# Patient Record
Sex: MALE | Race: BLACK OR AFRICAN AMERICAN | NOT HISPANIC OR LATINO | URBAN - METROPOLITAN AREA
[De-identification: names, ages, dates, MRNs, and addresses within clinical notes are randomized per-mention and may not be internally consistent; named-entity substitution may affect disease eponyms.]

---

## 2019-03-10 ENCOUNTER — EMERGENCY (EMERGENCY)
Facility: HOSPITAL | Age: 59
LOS: 1 days | Discharge: HOME | End: 2019-03-10
Attending: EMERGENCY MEDICINE
Payer: SELF-PAY

## 2019-03-10 VITALS
WEIGHT: 139.99 LBS | OXYGEN SATURATION: 96 % | HEIGHT: 64 IN | DIASTOLIC BLOOD PRESSURE: 82 MMHG | SYSTOLIC BLOOD PRESSURE: 162 MMHG | RESPIRATION RATE: 16 BRPM | TEMPERATURE: 98 F | HEART RATE: 83 BPM

## 2019-03-10 VITALS
HEART RATE: 74 BPM | OXYGEN SATURATION: 99 % | TEMPERATURE: 98 F | RESPIRATION RATE: 15 BRPM | DIASTOLIC BLOOD PRESSURE: 70 MMHG | SYSTOLIC BLOOD PRESSURE: 138 MMHG

## 2019-03-10 PROCEDURE — 71046 X-RAY EXAM CHEST 2 VIEWS: CPT

## 2019-03-10 PROCEDURE — 94640 AIRWAY INHALATION TREATMENT: CPT

## 2019-03-10 PROCEDURE — 99284 EMERGENCY DEPT VISIT MOD MDM: CPT | Mod: 25

## 2019-03-10 PROCEDURE — 99283 EMERGENCY DEPT VISIT LOW MDM: CPT

## 2019-03-10 PROCEDURE — 71046 X-RAY EXAM CHEST 2 VIEWS: CPT | Mod: 26

## 2019-03-10 RX ORDER — AZITHROMYCIN 500 MG/1
1 TABLET, FILM COATED ORAL
Qty: 6 | Refills: 0 | OUTPATIENT
Start: 2019-03-10 | End: 2019-03-14

## 2019-03-10 RX ORDER — LORATADINE 10 MG/1
10 TABLET ORAL DAILY
Qty: 0 | Refills: 0 | Status: DISCONTINUED | OUTPATIENT
Start: 2019-03-10 | End: 2019-03-14

## 2019-03-10 RX ORDER — AZITHROMYCIN 500 MG/1
500 TABLET, FILM COATED ORAL ONCE
Qty: 0 | Refills: 0 | Status: COMPLETED | OUTPATIENT
Start: 2019-03-10 | End: 2019-03-10

## 2019-03-10 RX ORDER — ALBUTEROL 90 UG/1
2.5 AEROSOL, METERED ORAL ONCE
Qty: 0 | Refills: 0 | Status: COMPLETED | OUTPATIENT
Start: 2019-03-10 | End: 2019-03-10

## 2019-03-10 RX ADMIN — AZITHROMYCIN 500 MILLIGRAM(S): 500 TABLET, FILM COATED ORAL at 22:38

## 2019-03-10 RX ADMIN — Medication 20 MILLIGRAM(S): at 21:42

## 2019-03-10 RX ADMIN — ALBUTEROL 2.5 MILLIGRAM(S): 90 AEROSOL, METERED ORAL at 21:42

## 2019-03-10 RX ADMIN — LORATADINE 10 MILLIGRAM(S): 10 TABLET ORAL at 21:41

## 2019-03-10 NOTE — ED PROVIDER NOTE - OBJECTIVE STATEMENT
57 y/o F patient w/ no significant PMHx and no significant PSHx presents to the ED with a cough. Patient reports he has rajni experiencing a productive cough for more than x2 weeks. Patient states he has become short of breathe along with feeling dizzy and nasal congestion. Patient states his cough occurs mostly at night. 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 Patient denies receiving the flu shot this season. Patient denies a lack of appetite, fever, and any other complaints. NKDA.

## 2020-04-30 NOTE — ED ADULT NURSE NOTE - CAS DISCH TRANSFER METHOD
Bed: B01A  Expected date: 4/30/20  Expected time: 12:21 PM  Means of arrival: [facility] ([id number])  Comments:  68M HAx 4 hours. Bilateral hand weakness. 98.7, 160/90, 78, 18, 98%. Respiratory symptoms. SS inconclusive.    Report taken by:  CRISTINO Cavanaugh   Transportation service

## 2021-02-18 NOTE — ED ADULT NURSE NOTE - CAS TRG GENERAL NORM CIRC DET
You were seen at the [facility] clinic today.  You saw Dr. Pepe Rdz  Here are your Instructions:    1. Stop amiodarone on Saturday (2/20/21.)  2. 14-day ZioPatch heart rhythm monitor in 2 months.  3. Follow up with Dr. Rdz in 3 months.  
Strong peripheral pulses

## 2023-08-25 NOTE — ED ADULT NURSE NOTE - NS_NURSE_DISC_TEACHING_YN_ED_ALL_ED
See TE.   
This [facility] message has been forwarded to you from a monitored pool.  Please do not reply to this message.  All responses should be sent directly to the patient.   
Yes